# Patient Record
Sex: FEMALE | Race: WHITE | NOT HISPANIC OR LATINO | Employment: OTHER | URBAN - METROPOLITAN AREA
[De-identification: names, ages, dates, MRNs, and addresses within clinical notes are randomized per-mention and may not be internally consistent; named-entity substitution may affect disease eponyms.]

---

## 2023-03-06 PROBLEM — Z01.419 ENCOUNTER FOR GYNECOLOGICAL EXAMINATION (GENERAL) (ROUTINE) WITHOUT ABNORMAL FINDINGS: Status: ACTIVE | Noted: 2023-03-06

## 2023-03-06 NOTE — PROGRESS NOTES
Assessment/Plan:    Encounter for gynecological examination (general) (routine) without abnormal findings  All well, no complaints  Normal breast and pelvic exams  Last pap 2/2020 neg; no further indicated  Mammo order given, last 2016  Colonoscopy 2017 due 2027  Dexa 2016, order given       Diagnoses and all orders for this visit:    Encounter for gynecological examination (general) (routine) without abnormal findings    Encounter for screening mammogram for breast cancer  -     Mammo screening bilateral w 3d & cad; Future    Asymptomatic menopausal state  -     DXA bone density spine hip and pelvis; Future    Osteoporosis screening  -     DXA bone density spine hip and pelvis; Future    Other orders  -     Liquid-based pap, screening  -     losartan (COZAAR) 25 mg tablet; Take 50 mg by mouth daily  -     buPROPion (WELLBUTRIN XL) 150 mg 24 hr tablet; Take 150 mg by mouth every morning  -     metoprolol succinate (TOPROL-XL) 25 mg 24 hr tablet; Take 25 mg by mouth daily          Subjective:      Patient ID: Adin Mendez is a 67 y o  female  HPI Here for Medicare biannual breast and pelvic exams  Last 2/2020 SK    The following portions of the patient's history were reviewed and updated as appropriate:   She  has a past medical history of Hypertension, Osteopenia, and Renal calculi  She   Patient Active Problem List    Diagnosis Date Noted   • Encounter for gynecological examination (general) (routine) without abnormal findings 03/06/2023     She  has a past surgical history that includes Parathyroidectomy (2013); Ankle surgery; and Colonoscopy (2017)  Her family history includes Other in her mother  She  reports that she has never smoked  She has never used smokeless tobacco  She reports that she does not drink alcohol and does not use drugs    Current Outpatient Medications   Medication Sig Dispense Refill   • buPROPion (WELLBUTRIN XL) 150 mg 24 hr tablet Take 150 mg by mouth every morning     • losartan (COZAAR) 25 mg tablet Take 50 mg by mouth daily     • metoprolol succinate (TOPROL-XL) 25 mg 24 hr tablet Take 25 mg by mouth daily       No current facility-administered medications for this visit  She is allergic to bee venom, iodinated contrast media, prochlorperazine, and latex       Review of Systems  No PMB, breast, bladder, bowel changes   No new persistent pain, bloating, early satiety or pelvic pressure      Objective:      /80   Ht 5' 7" (1 702 m)   Wt 73 kg (161 lb)   BMI 25 22 kg/m²          Physical Exam    General appearance: no distress, pleasant  Neck: thyroid without nodules or thyromegaly, no palpable adenopathy  Lymph nodes: no palpable adenopathy  Breasts: no masses, nodes or skin changes  Abdomen: soft, non tender, no palpable masses  Pelvic exam: normal atrophic external genitalia, urethral meatus normal, vagina atrophic without lesions, cervix atrophic without lesions, uterus small, non tender, no adnexal masses, non tender  Rectal exam: normal sphincter tone, no masses, RV confirms above

## 2023-03-07 ENCOUNTER — OFFICE VISIT (OUTPATIENT)
Dept: OBGYN CLINIC | Facility: CLINIC | Age: 73
End: 2023-03-07

## 2023-03-07 VITALS
SYSTOLIC BLOOD PRESSURE: 122 MMHG | DIASTOLIC BLOOD PRESSURE: 80 MMHG | HEIGHT: 67 IN | WEIGHT: 161 LBS | BODY MASS INDEX: 25.27 KG/M2

## 2023-03-07 DIAGNOSIS — Z12.31 ENCOUNTER FOR SCREENING MAMMOGRAM FOR BREAST CANCER: ICD-10-CM

## 2023-03-07 DIAGNOSIS — Z01.419 ENCOUNTER FOR GYNECOLOGICAL EXAMINATION (GENERAL) (ROUTINE) WITHOUT ABNORMAL FINDINGS: Primary | ICD-10-CM

## 2023-03-07 DIAGNOSIS — Z13.820 OSTEOPOROSIS SCREENING: ICD-10-CM

## 2023-03-07 DIAGNOSIS — Z78.0 ASYMPTOMATIC MENOPAUSAL STATE: ICD-10-CM

## 2023-03-07 RX ORDER — METOPROLOL SUCCINATE 25 MG/1
25 TABLET, EXTENDED RELEASE ORAL DAILY
COMMUNITY

## 2023-03-07 RX ORDER — BUPROPION HYDROCHLORIDE 150 MG/1
150 TABLET ORAL EVERY MORNING
COMMUNITY
Start: 2023-02-01

## 2023-03-07 RX ORDER — LOSARTAN POTASSIUM 25 MG/1
50 TABLET ORAL DAILY
COMMUNITY
Start: 2023-02-24

## 2023-03-07 NOTE — ASSESSMENT & PLAN NOTE
All well, no complaints  Normal breast and pelvic exams    Last pap 2/2020 neg; no further indicated  Mammo order given, last 2016  Colonoscopy 2017 due 2027  Dexa 2016, order given

## 2023-03-07 NOTE — PATIENT INSTRUCTIONS
Return to office in two years unless having any problems such as breast changes, bleeding, new persistent pain, new progressive bloating, new problems eating (getting full to quickly) or new constant urinary pressure that does not resolve in one week  Call in September of 2024 for the March 2025 visit  Try Aquafor or vitamin A&D ointment

## 2023-04-27 DIAGNOSIS — Z78.0 ASYMPTOMATIC MENOPAUSAL STATE: ICD-10-CM

## 2023-04-27 DIAGNOSIS — Z13.820 OSTEOPOROSIS SCREENING: ICD-10-CM

## 2023-04-28 ENCOUNTER — TELEPHONE (OUTPATIENT)
Dept: OBGYN CLINIC | Facility: CLINIC | Age: 73
End: 2023-04-28

## 2023-04-28 NOTE — TELEPHONE ENCOUNTER
Left a message for patient to call back regarding request of Dr John Javier to schedule [facility] to discuss Dexa and increase risk for hip fracture

## 2023-05-02 PROBLEM — Z91.89 HIGH RISK FOR HIP FRACTURE: Status: ACTIVE | Noted: 2023-05-02

## 2023-05-02 NOTE — TELEPHONE ENCOUNTER
Spoke with Bina Mistry about Dexa Scan results & Dr Sapna Winston recommendation  Bina Mistry states she [facility]  She has scheduled an appt with her PCP, to discuss results & medication  Bina Mistry wants her PCP to handle all of her medications, then having different physicians  Sent message to Dr Louis Suresh

## 2023-05-09 DIAGNOSIS — Z12.31 ENCOUNTER FOR SCREENING MAMMOGRAM FOR BREAST CANCER: ICD-10-CM

## 2023-08-28 ENCOUNTER — OFFICE VISIT (OUTPATIENT)
Dept: OBGYN CLINIC | Facility: CLINIC | Age: 73
End: 2023-08-28
Payer: MEDICARE

## 2023-08-28 VITALS
WEIGHT: 166 LBS | HEIGHT: 68 IN | SYSTOLIC BLOOD PRESSURE: 122 MMHG | DIASTOLIC BLOOD PRESSURE: 70 MMHG | BODY MASS INDEX: 25.16 KG/M2

## 2023-08-28 DIAGNOSIS — L29.9 SCALP ITCH: ICD-10-CM

## 2023-08-28 DIAGNOSIS — N76.0 VULVOVAGINITIS: Primary | ICD-10-CM

## 2023-08-28 PROCEDURE — 99213 OFFICE O/P EST LOW 20 MIN: CPT | Performed by: NURSE PRACTITIONER

## 2023-08-28 RX ORDER — ZOLPIDEM TARTRATE 5 MG/1
TABLET ORAL AS NEEDED
COMMUNITY
Start: 2023-08-25

## 2023-08-28 RX ORDER — CLOTRIMAZOLE AND BETAMETHASONE DIPROPIONATE 10; .64 MG/G; MG/G
CREAM TOPICAL 2 TIMES DAILY
Qty: 45 G | Refills: 1 | Status: SHIPPED | OUTPATIENT
Start: 2023-08-28

## 2023-08-28 RX ORDER — FLUCONAZOLE 150 MG/1
150 TABLET ORAL ONCE
Qty: 2 TABLET | Refills: 0 | Status: SHIPPED | OUTPATIENT
Start: 2023-08-28 | End: 2023-08-28

## 2023-08-28 RX ORDER — ACETAMINOPHEN AND CODEINE PHOSPHATE 300; 30 MG/1; MG/1
TABLET ORAL AS NEEDED
COMMUNITY

## 2023-08-28 RX ORDER — KETOCONAZOLE 20 MG/ML
1 SHAMPOO TOPICAL ONCE
Qty: 1 ML | Refills: 0 | Status: SHIPPED | OUTPATIENT
Start: 2023-08-28 | End: 2023-08-28

## 2023-08-28 NOTE — PATIENT INSTRUCTIONS
Open to air at bedtime. Cotton underwear. Wear looser fitting clothing. Get out of exercise clothes and bathing suits ASAP. Avoid bathroom wipes, feminine washes/scented soaps. Watch sugar and carb intake. Refrain from sexual activity while on treatment.     Lotrisone cream twice a day for 1 week  Diflucan 1 now repeat in 3 days  Itchy scalp Ketoconozole shampoo x 1 dose  Zyrtec 1 tab daily

## 2023-08-28 NOTE — PROGRESS NOTES
Assessment/Plan:  Open to air at bedtime. Cotton underwear. Wear looser fitting clothing. Get out of exercise clothes and bathing suits ASAP. Avoid bathroom wipes, feminine washes/scented soaps. Watch sugar and carb intake. Refrain from sexual activity while on treatment. Lotrisone cream twice a day for 1 week  Diflucan 1 now repeat in 3 days  Itchy scalp Ketoconozole shampoo x 1 dose  Zyrtec 1 tab daily       Diagnoses and all orders for this visit:    Vulvovaginitis  -     clotrimazole-betamethasone (LOTRISONE) 1-0.05 % cream; Apply topically 2 (two) times a day  -     fluconazole (DIFLUCAN) 150 mg tablet; Take 1 tablet (150 mg total) by mouth once for 1 dose Repeat in 3 days    Scalp itch  -     ketoconazole (NIZORAL) 2 % shampoo; Apply 1 Application topically once for 1 dose    Other orders  -     acetaminophen-codeine (TYLENOL/CODEINE #3) 300-30 MG per tablet; Take by mouth if needed  -     zolpidem (AMBIEN) 5 mg tablet; if needed          Subjective:      Patient ID: Yumiko Koroma is a 67 y.o. female. Here for eval of prolonged yeast infection Last seen Dr Gamboa Gravely 3/2023 for [facility] She was dx with Lymes disease and just completed 3 weeks of antibiotics She has been having a lot of vaginal itching Some burning  She is also c/o scalp itching  She states couldn't stand it treated herself with lice shampoo twice because she did not know what else to do and had no improvement       The following portions of the patient's history were reviewed and updated as appropriate: allergies, current medications, past family history, past medical history, past social history, past surgical history and problem list.    Review of Systems   Constitutional: Negative for chills and fever. Genitourinary: Positive for vaginal pain. Negative for dysuria, frequency, pelvic pain and vaginal bleeding.         Vaginal itching/burning         Objective:      /70   Ht 5' 8" (1.727 m)   Wt 75.3 kg (166 lb)   Breastfeeding No BMI 25.24 kg/m²          Physical Exam  Vitals and nursing note reviewed. Constitutional:       General: She is not in acute distress. Appearance: Normal appearance. HENT:      Head: Normocephalic and atraumatic. Abdominal:      General: There is no distension. Palpations: There is no mass. Tenderness: There is no abdominal tenderness. Genitourinary:     Exam position: Lithotomy position. Labia:         Right: No rash or lesion. Left: No rash or lesion. Vagina: Vaginal discharge and erythema present. Cervix: No cervical motion tenderness, discharge, lesion or cervical bleeding. Uterus: Normal.    Lymphadenopathy:      Lower Body: No right inguinal adenopathy. No left inguinal adenopathy. Skin:     General: Skin is warm and dry. Neurological:      General: No focal deficit present. Mental Status: She is alert and oriented to person, place, and time. Psychiatric:         Mood and Affect: Mood normal.         Behavior: Behavior normal.         Thought Content:  Thought content normal.

## 2023-09-05 NOTE — PROGRESS NOTES
Assessment/Plan:  Open to air at bedtime. Cotton underwear. Wear looser fitting clothing. Get out of exercise clothes and bathing suits ASAP. Avoid bathroom wipes, feminine washes/scented soaps. Wash with cetaphil cleanser or just water. NO soaps . Watch sugar and carb intake. Refrain from sexual activity while on treatment  Lotrisone cream twice a day for 2 weeks  Calmoseptine throughout the day as needed as soother  Culture sent out   Terazol 1 applicator at bedtime for 7 nights        Diagnoses and all orders for this visit:    Vulvovaginitis  -     SURESWAB ADVANCED VAGINITIS, TMA  -     terconazole (TERAZOL 7) 0.4 % vaginal cream; Insert 1 applicator into the vagina daily at bedtime for 7 days    Other orders  -     Liquid-based pap, screening          Subjective:      Patient ID: Le Rueda is a 67 y.o. female. Seen 9 days ago with vulvovaginal itching having completed a 3 weeks of abx for lymes She was given diflucan and lotrisone cream She took both diflucan and used the lotrisone for a few days and then stopped  She is here as not much better Feels less swollen but still has vaginal itching and burning with wiping Using water only  Has sensitive skin so uses clear and free detergent Concerned as has upcoming hip replacement surgery in 2 weeks and was worried about getting a yeast infection from that. The following portions of the patient's history were reviewed and updated as appropriate: allergies, current medications, past family history, past medical history, past social history, past surgical history and problem list.    Review of Systems   Constitutional: Negative for chills and fever. Genitourinary: Positive for vaginal discharge and vaginal pain. Negative for dysuria, genital sores and pelvic pain. Psychiatric/Behavioral: The patient is nervous/anxious.           Objective:      /76 (BP Location: Left arm, Patient Position: Sitting, Cuff Size: Large)   Ht 5' 8" (1.727 m) Wt 74 kg (163 lb 3.2 oz)   BMI 24.81 kg/m²          Physical Exam  Vitals and nursing note reviewed. Constitutional:       General: She is not in acute distress. Appearance: Normal appearance. HENT:      Head: Normocephalic and atraumatic. Abdominal:      General: There is no distension. Palpations: There is no mass. Tenderness: There is no abdominal tenderness. Genitourinary:     Exam position: Lithotomy position. Labia:         Right: No rash or lesion. Left: No rash or lesion. Vagina: Vaginal discharge and erythema present. Cervix: No cervical motion tenderness, discharge, lesion or cervical bleeding. Uterus: Normal.       Comments: Erythema external genitalia moderate amount of thick white discharge in vagina removed with scopette   Sureswab obtained   Lymphadenopathy:      Lower Body: No right inguinal adenopathy. No left inguinal adenopathy. Skin:     General: Skin is warm and dry. Neurological:      General: No focal deficit present. Mental Status: She is alert and oriented to person, place, and time. Psychiatric:         Mood and Affect: Mood normal.         Behavior: Behavior normal.         Thought Content:  Thought content normal.

## 2023-09-06 ENCOUNTER — OFFICE VISIT (OUTPATIENT)
Dept: OBGYN CLINIC | Facility: CLINIC | Age: 73
End: 2023-09-06
Payer: MEDICARE

## 2023-09-06 VITALS
WEIGHT: 163.2 LBS | HEIGHT: 68 IN | DIASTOLIC BLOOD PRESSURE: 76 MMHG | SYSTOLIC BLOOD PRESSURE: 128 MMHG | BODY MASS INDEX: 24.74 KG/M2

## 2023-09-06 DIAGNOSIS — N76.0 VULVOVAGINITIS: Primary | ICD-10-CM

## 2023-09-06 PROCEDURE — 99213 OFFICE O/P EST LOW 20 MIN: CPT | Performed by: NURSE PRACTITIONER

## 2023-09-06 NOTE — PATIENT INSTRUCTIONS
Open to air at bedtime. Cotton underwear. Wear looser fitting clothing. Get out of exercise clothes and bathing suits ASAP. Avoid bathroom wipes, feminine washes/scented soaps. Wash with cetaphil cleanser or just water. NO soaps . Watch sugar and carb intake.  Refrain from sexual activity while on treatment  Lotrisone cream twice a day for 2 weeks  Calmoseptine throughout the day as needed as soother  Culture sent out   Terazol 1 applicator at bedtime for 7 nights

## 2023-09-07 LAB
BV BACTERIA RRNA VAG QL NAA+PROBE: NEGATIVE
C GLABRATA RNA VAG QL NAA+PROBE: NOT DETECTED
CANDIDA RRNA VAG QL PROBE: NOT DETECTED
T VAGINALIS RRNA SPEC QL NAA+PROBE: NOT DETECTED

## 2023-09-13 NOTE — PROGRESS NOTES
Assessment/Plan:    Vulvar irritation  Anxious re: upcoming right hip replacement and possible yeast. Had negative cultures for candida, etc 9/6/23. Reports labia majora irritation and introital irritation with wiping - using Paxton. Exam with atrophy, no abnormal discharge. Recommend continuing with every other day Cetaphil, switching to [place] paper, dabbing only, barrier with Aquafor or vitamin A&D. Reassurance given. High risk for hip fracture - 2023. Dexa from 4/2023 reviewed with osteopenia and ten year hip fracture risk of 5%. Interested in medication, orthopedic surgeon instructed to start six wks from hip replacement. Reviewed risk of hip fracture. Reviewed impact of hip fracture on overall morbidity and mortality. Encouraged consideration of health impact of diagnosis balanced with risks of medications when making decisions on medications versus conservative monitoring. Discussed with patient dexa results and increased risk of fracture. Risks and benefits of bisphosphonates, Evista reviewed. Aware of GI side effects of bisphosphonates and appropriate administration technique to decrease risk. Aware of increased risk of osteonecrosis of jaw, muscle and joint aches and increased risk of long bone fracture with prolonged use of bisphosphonates. Increased risk of thrombosis with Evista discussed and pt declined consideration of it. At the end of our discussion, Artemio Clinton would like to start fosamax after surgery. Rx sent. Encouraged confirming with surgeon appropriate timing for initiation. Repeat dexa in 2 years for follow up. Diagnoses and all orders for this visit:    Vulvar irritation    High risk for hip fracture - 2023. PCP to manage per pt  -     alendronate (FOSAMAX) 70 mg tablet; Take 1 tablet (70 mg total) by mouth every 7 days          Subjective:      Patient ID: Blaze Hagan is a 67 y.o. female.     HPI here for follow up of vaginal irritation and would like to discuss dexa. The following portions of the patient's history were reviewed and updated as appropriate:   She  has a past medical history of Arthritis, Hypertension, Osteopenia, and Renal calculi. She   Patient Active Problem List    Diagnosis Date Noted   • Vulvar irritation 09/15/2023   • High risk for hip fracture - 2023.  05/02/2023   • Encounter for gynecological examination (general) (routine) without abnormal findings 03/06/2023     She  has a past surgical history that includes Parathyroidectomy (2013); Ankle surgery; and Colonoscopy (2017). Her family history includes Other in her mother. She  reports that she has never smoked. She has never used smokeless tobacco. She reports that she does not drink alcohol and does not use drugs. Current Outpatient Medications   Medication Sig Dispense Refill   • acetaminophen-codeine (TYLENOL/CODEINE #3) 300-30 MG per tablet Take by mouth if needed     • alendronate (FOSAMAX) 70 mg tablet Take 1 tablet (70 mg total) by mouth every 7 days 12 tablet 2   • buPROPion (WELLBUTRIN XL) 150 mg 24 hr tablet Take 150 mg by mouth every morning     • ketoconazole (NIZORAL) 2 % shampoo Apply 1 Application topically once for 1 dose 1 mL 0   • losartan (COZAAR) 25 mg tablet Take 50 mg by mouth daily     • metoprolol succinate (TOPROL-XL) 25 mg 24 hr tablet Take 25 mg by mouth daily     • zolpidem (AMBIEN) 5 mg tablet if needed     • clotrimazole-betamethasone (LOTRISONE) 1-0.05 % cream Apply topically 2 (two) times a day (Patient not taking: Reported on 9/6/2023) 45 g 1     No current facility-administered medications for this visit. She is allergic to bee venom, doxycycline, iodinated contrast media, prochlorperazine, and latex. .    Review of Systems  +vulvar irritation, no bleeding    Objective:      /64 (BP Location: Left arm, Patient Position: Sitting, Cuff Size: Large)   Ht 5' 6.75" (1.695 m)   Wt 73.8 kg (162 lb 12.8 oz)   BMI 25.69 kg/m²          Physical Exam    Appears well, no apparent distress.    Somewhat anxious  Pelvic exam: atrophic external genitalia without erythema, induration or white epithelium, vagina atrophic without discharge

## 2023-09-15 ENCOUNTER — OFFICE VISIT (OUTPATIENT)
Dept: OBGYN CLINIC | Facility: CLINIC | Age: 73
End: 2023-09-15
Payer: MEDICARE

## 2023-09-15 VITALS
HEIGHT: 67 IN | SYSTOLIC BLOOD PRESSURE: 120 MMHG | DIASTOLIC BLOOD PRESSURE: 64 MMHG | BODY MASS INDEX: 25.55 KG/M2 | WEIGHT: 162.8 LBS

## 2023-09-15 DIAGNOSIS — N90.89 VULVAR IRRITATION: Primary | ICD-10-CM

## 2023-09-15 DIAGNOSIS — Z91.89 HIGH RISK FOR HIP FRACTURE: ICD-10-CM

## 2023-09-15 PROCEDURE — 99214 OFFICE O/P EST MOD 30 MIN: CPT | Performed by: OBSTETRICS & GYNECOLOGY

## 2023-09-15 RX ORDER — ALENDRONATE SODIUM 70 MG/1
70 TABLET ORAL
Qty: 12 TABLET | Refills: 2 | Status: SHIPPED | OUTPATIENT
Start: 2023-09-15

## 2023-09-15 NOTE — ASSESSMENT & PLAN NOTE
Dexa from 4/2023 reviewed with osteopenia and ten year hip fracture risk of 5%. Interested in medication, orthopedic surgeon instructed to start six wks from hip replacement. Reviewed risk of hip fracture. Reviewed impact of hip fracture on overall morbidity and mortality. Encouraged consideration of health impact of diagnosis balanced with risks of medications when making decisions on medications versus conservative monitoring. Discussed with patient dexa results and increased risk of fracture. Risks and benefits of bisphosphonates, Evista reviewed. Aware of GI side effects of bisphosphonates and appropriate administration technique to decrease risk. Aware of increased risk of osteonecrosis of jaw, muscle and joint aches and increased risk of long bone fracture with prolonged use of bisphosphonates. Increased risk of thrombosis with Evista discussed and pt declined consideration of it. At the end of our discussion, Laura Dominique would like to start fosamax after surgery. Rx sent. Encouraged confirming with surgeon appropriate timing for initiation. Repeat dexa in 2 years for follow up.

## 2023-09-15 NOTE — PATIENT INSTRUCTIONS
Switch to [employer] toilet paper and try to dab instead of wiping. Try a barrier daily with Aquafor or Vitamin A&D ointment. Ask your orthopedic surgeon if the fosamax should start six weeks from your hip replacement.

## 2023-09-15 NOTE — ASSESSMENT & PLAN NOTE
Anxious re: upcoming right hip replacement and possible yeast. Had negative cultures for candida, etc 9/6/23. Reports labia majora irritation and introital irritation with wiping - using Paxton. Exam with atrophy, no abnormal discharge. Recommend continuing with every other day Cetaphil, switching to [place] paper, dabbing only, barrier with Aquafor or vitamin A&D. Reassurance given.

## 2023-09-15 NOTE — LETTER
September 15, 2023     Katlyn Li DO  [facility] [address] [place] [address]    Patient: Vitor Izaguirre   YOB: 1950   Date of Visit: 9/15/2023       Dear Dr. Stormy Dover: Thank you for referring Fernando Rojas to me for evaluation. Below are my notes for this consultation. If you have questions, please do not hesitate to call me. I look forward to following your patient along with you. Sincerely,        Karyna Cade MD        CC: No Recipients    Karyna Cade MD  9/15/2023  9:14 AM  Sign when Signing Visit  Assessment/Plan:    Vulvar irritation  Anxious re: upcoming right hip replacement and possible yeast. Had negative cultures for candida, etc 9/6/23. Reports labia majora irritation and introital irritation with wiping - using Paxton. Exam with atrophy, no abnormal discharge. Recommend continuing with every other day Cetaphil, switching to [place] paper, dabbing only, barrier with Aquafor or vitamin A&D. Reassurance given. High risk for hip fracture - 2023. Dexa from 4/2023 reviewed with osteopenia and ten year hip fracture risk of 5%. Interested in medication, orthopedic surgeon instructed to start six wks from hip replacement. Reviewed risk of hip fracture. Reviewed impact of hip fracture on overall morbidity and mortality. Encouraged consideration of health impact of diagnosis balanced with risks of medications when making decisions on medications versus conservative monitoring. Discussed with patient dexa results and increased risk of fracture. Risks and benefits of bisphosphonates, Evista reviewed. Aware of GI side effects of bisphosphonates and appropriate administration technique to decrease risk. Aware of increased risk of osteonecrosis of jaw, muscle and joint aches and increased risk of long bone fracture with prolonged use of bisphosphonates. Increased risk of thrombosis with Evista discussed and pt declined consideration of it.   At the end of our discussion, Domenico Lopez would like to start fosamax after surgery. Rx sent. Encouraged confirming with surgeon appropriate timing for initiation. Repeat dexa in 2 years for follow up. Diagnoses and all orders for this visit:    Vulvar irritation    High risk for hip fracture - 2023. PCP to manage per pt  -     alendronate (FOSAMAX) 70 mg tablet; Take 1 tablet (70 mg total) by mouth every 7 days         Subjective:     Patient ID: Jeannette Wood is a 67 y.o. female. HPI here for follow up of vaginal irritation and would like to discuss dexa. The following portions of the patient's history were reviewed and updated as appropriate:   She  has a past medical history of Arthritis, Hypertension, Osteopenia, and Renal calculi. She   Patient Active Problem List    Diagnosis Date Noted   • Vulvar irritation 09/15/2023   • High risk for hip fracture - 2023.  05/02/2023   • Encounter for gynecological examination (general) (routine) without abnormal findings 03/06/2023     She  has a past surgical history that includes Parathyroidectomy (2013); Ankle surgery; and Colonoscopy (2017). Her family history includes Other in her mother. She  reports that she has never smoked. She has never used smokeless tobacco. She reports that she does not drink alcohol and does not use drugs.   Current Outpatient Medications   Medication Sig Dispense Refill   • acetaminophen-codeine (TYLENOL/CODEINE #3) 300-30 MG per tablet Take by mouth if needed     • alendronate (FOSAMAX) 70 mg tablet Take 1 tablet (70 mg total) by mouth every 7 days 12 tablet 2   • buPROPion (WELLBUTRIN XL) 150 mg 24 hr tablet Take 150 mg by mouth every morning     • ketoconazole (NIZORAL) 2 % shampoo Apply 1 Application topically once for 1 dose 1 mL 0   • losartan (COZAAR) 25 mg tablet Take 50 mg by mouth daily     • metoprolol succinate (TOPROL-XL) 25 mg 24 hr tablet Take 25 mg by mouth daily     • zolpidem (AMBIEN) 5 mg tablet if needed     • clotrimazole-betamethasone (LOTRISONE) 1-0.05 % cream Apply topically 2 (two) times a day (Patient not taking: Reported on 9/6/2023) 45 g 1     No current facility-administered medications for this visit. She is allergic to bee venom, doxycycline, iodinated contrast media, prochlorperazine, and latex. .    Review of Systems +vulvar irritation, no bleeding    Objective:      /64 (BP Location: Left arm, Patient Position: Sitting, Cuff Size: Large)   Ht 5' 6.75" (1.695 m)   Wt 73.8 kg (162 lb 12.8 oz)   BMI 25.69 kg/m²         Physical Exam   Appears well, no apparent distress.    Somewhat anxious  Pelvic exam: atrophic external genitalia without erythema, induration or white epithelium, vagina atrophic without discharge

## 2024-02-21 PROBLEM — Z01.419 ENCOUNTER FOR GYNECOLOGICAL EXAMINATION (GENERAL) (ROUTINE) WITHOUT ABNORMAL FINDINGS: Status: RESOLVED | Noted: 2023-03-06 | Resolved: 2024-02-21

## 2024-04-18 ENCOUNTER — HOSPITAL ENCOUNTER (OUTPATIENT)
Dept: CT IMAGING | Facility: HOSPITAL | Age: 74
Discharge: HOME | End: 2024-04-18
Payer: COMMERCIAL

## 2024-04-18 DIAGNOSIS — I10 HYPERTENSION COMPLICATIONS: ICD-10-CM

## 2024-04-18 PROCEDURE — 75571 CT HRT W/O DYE W/CA TEST: CPT

## 2024-06-18 ENCOUNTER — NURSE TRIAGE (OUTPATIENT)
Age: 74
End: 2024-06-18

## 2024-06-18 NOTE — TELEPHONE ENCOUNTER
"Reason for Disposition  • Abnormal color vaginal discharge (i.e., yellow, green, gray)    Answer Assessment - Initial Assessment Questions  1. DISCHARGE: \"Describe the discharge.\" (e.g., white, yellow, green, gray, foamy, cottage cheese-like)      green  2. ODOR: \"Is there a bad odor?\"      denies  3. ONSET: \"When did the discharge begin?\"         4. RASH: \"Is there a rash in that area?\" If Yes, ask: \"Describe it.\" (e.g., redness, blisters, sores, bumps)      3 days ago  5. ABDOMINAL PAIN: \"Are you having any abdominal pain?\" If Yes, ask: \"What does it feel like? \" (e.g., crampy, dull, intermittent, constant)       denies  6. ABDOMINAL PAIN SEVERITY: If present, ask: \"How bad is it?\"  (e.g., mild, moderate, severe)   - MILD - doesn't interfere with normal activities    - MODERATE - interferes with normal activities or awakens from sleep    - SEVERE - patient doesn't want to move (R/O peritonitis)       N/a  7. CAUSE: \"What do you think is causing the discharge?\" \"Have you had the same problem before? What happened then?\"      Finished antibiotics  8. OTHER SYMPTOMS: \"Do you have any other symptoms?\" (e.g., fever, itching, vaginal bleeding, pain with urination, injury to genital area, vaginal foreign body)      Vaginal irritation  9. PREGNANCY: \"Is there any chance you are pregnant?\" \"When was your last menstrual period?\"      Post menopausal    Protocols used: Vaginal Discharge-ADULT-[place]    "

## 2024-06-18 NOTE — TELEPHONE ENCOUNTER
Regarding: yeast infection symptoms  ----- Message from Ally KISER sent at 6/18/2024  3:07 PM EDT -----  Patient calling in with symptoms of yeast infection. Patient was on antibiotics prior and took the 7 day monistat and is still having a greenish discharge.      Tried CTS

## 2024-06-19 NOTE — PROGRESS NOTES
"Assessment/Plan:  Open to air at bedtime. Cotton underwear.  Wear looser fitting clothing. Get out of exercise clothes and bathing suits ASAP. Avoid bathroom wipes, feminine washes/scented soaps. Wash with cetaphil cleanser or just water.  NO soaps . Watch sugar and carb intake. Refrain from sexual activity while on treatment          Diagnoses and all orders for this visit:    Vulvar irritation  -     betamethasone, augmented, (DIPROLENE-AF) 0.05 % cream; Apply topically 2 (two) times a day    Other orders  -     escitalopram (LEXAPRO) 10 mg tablet; TAKE 1 TAB ORALLY DAILY  -     dilTIAZem HCl  MG TB24; Take 1 tablet by mouth daily          Subjective:      Patient ID: Windy Hager is a 73 y.o. female.    Here for eval was having c/o greenish vaginal discharge Pt was on Abx for the past month for lymes disease again and dev yeast infection  Used Monistat x 7 nights and still having external irritation         The following portions of the patient's history were reviewed and updated as appropriate: allergies, current medications, past family history, past medical history, past social history, past surgical history, and problem list.    Review of Systems   Constitutional:  Negative for chills and fever.   Gastrointestinal:  Negative for abdominal pain.   Genitourinary:  Negative for dysuria, frequency, genital sores, vaginal bleeding, vaginal discharge and vaginal pain.        Vulvar irritation         Objective:      /50 (BP Location: Right arm, Patient Position: Sitting, Cuff Size: Standard)   Temp 98 °F (36.7 °C)   Ht 5' 6.75\" (1.695 m)   Wt 75.8 kg (167 lb)   BMI 26.35 kg/m²          Physical Exam  Vitals and nursing note reviewed.   Constitutional:       General: She is not in acute distress.     Appearance: Normal appearance.   HENT:      Head: Normocephalic and atraumatic.   Abdominal:      General: There is no distension.      Palpations: There is no mass.      Tenderness: There is no " abdominal tenderness.   Genitourinary:     Exam position: Lithotomy position.      Labia:         Right: No rash or lesion.         Left: No rash or lesion.       Vagina: Erythema present. No vaginal discharge.      Cervix: No cervical motion tenderness, discharge, lesion or cervical bleeding.      Uterus: Normal.       Comments: Minimal erythema introitus   Lymphadenopathy:      Lower Body: No right inguinal adenopathy. No left inguinal adenopathy.   Skin:     General: Skin is warm and dry.   Neurological:      General: No focal deficit present.      Mental Status: She is alert and oriented to person, place, and time.   Psychiatric:         Mood and Affect: Mood normal.         Behavior: Behavior normal.         Thought Content: Thought content normal.

## 2024-06-20 ENCOUNTER — OFFICE VISIT (OUTPATIENT)
Dept: OBGYN CLINIC | Facility: CLINIC | Age: 74
End: 2024-06-20
Payer: MEDICARE

## 2024-06-20 VITALS
TEMPERATURE: 98 F | DIASTOLIC BLOOD PRESSURE: 50 MMHG | HEIGHT: 67 IN | WEIGHT: 167 LBS | BODY MASS INDEX: 26.21 KG/M2 | SYSTOLIC BLOOD PRESSURE: 110 MMHG

## 2024-06-20 DIAGNOSIS — N90.89 VULVAR IRRITATION: Primary | ICD-10-CM

## 2024-06-20 PROCEDURE — 99212 OFFICE O/P EST SF 10 MIN: CPT | Performed by: NURSE PRACTITIONER

## 2024-06-20 RX ORDER — DILTIAZEM HYDROCHLORIDE EXTENDED-RELEASE TABLETS 120 MG/1
1 TABLET, EXTENDED RELEASE ORAL DAILY
COMMUNITY
Start: 2024-04-04

## 2024-06-20 RX ORDER — ESCITALOPRAM OXALATE 10 MG/1
TABLET ORAL
COMMUNITY
Start: 2024-04-02

## 2024-06-20 RX ORDER — BETAMETHASONE DIPROPIONATE 0.5 MG/G
CREAM TOPICAL 2 TIMES DAILY
Qty: 50 G | Refills: 1 | Status: SHIPPED | OUTPATIENT
Start: 2024-06-20

## 2024-06-20 NOTE — PATIENT INSTRUCTIONS
Open to air at bedtime. Cotton underwear.  Wear looser fitting clothing. Get out of exercise clothes and bathing suits ASAP. Avoid bathroom wipes, feminine washes/scented soaps. Wash with cetaphil cleanser or just water.  NO soaps . Watch sugar and carb intake. Refrain from sexual activity while on treatment

## 2024-08-04 ENCOUNTER — HOSPITAL ENCOUNTER (OUTPATIENT)
Dept: ULTRASOUND IMAGING | Facility: HOSPITAL | Age: 74
Discharge: HOME | End: 2024-08-04
Payer: MEDICARE

## 2024-08-04 DIAGNOSIS — E04.1 THYROID NODULE: ICD-10-CM

## 2024-08-04 PROCEDURE — 76536 US EXAM OF HEAD AND NECK: CPT

## 2025-08-11 ENCOUNTER — NURSE TRIAGE (OUTPATIENT)
Age: 75
End: 2025-08-11

## 2025-08-13 PROBLEM — E78.2 MIXED HYPERLIPIDEMIA: Status: ACTIVE | Noted: 2025-08-13

## 2025-08-13 PROBLEM — M17.11 UNILATERAL PRIMARY OSTEOARTHRITIS, RIGHT KNEE: Status: ACTIVE | Noted: 2025-08-13

## 2025-08-13 PROBLEM — E04.1 NONTOXIC SINGLE THYROID NODULE: Status: ACTIVE | Noted: 2025-08-13

## 2025-08-13 PROBLEM — M19.079 PRIMARY OSTEOARTHRITIS, UNSPECIFIED ANKLE AND FOOT: Status: ACTIVE | Noted: 2025-08-13

## 2025-08-13 PROBLEM — E21.3 HYPERPARATHYROIDISM, UNSPECIFIED (HCC): Status: ACTIVE | Noted: 2025-08-13

## 2025-08-13 PROBLEM — M17.12 UNILATERAL PRIMARY OSTEOARTHRITIS, LEFT KNEE: Status: ACTIVE | Noted: 2025-08-13

## 2025-08-13 PROBLEM — M81.0 AGE-RELATED OSTEOPOROSIS WITHOUT CURRENT PATHOLOGICAL FRACTURE: Status: ACTIVE | Noted: 2025-08-13

## 2025-08-13 PROBLEM — E55.9 VITAMIN D DEFICIENCY: Status: ACTIVE | Noted: 2025-08-13

## 2025-08-13 PROBLEM — Z96.641 PRESENCE OF RIGHT ARTIFICIAL HIP JOINT: Status: ACTIVE | Noted: 2025-08-13

## 2025-08-13 PROBLEM — I10 ESSENTIAL (PRIMARY) HYPERTENSION: Status: ACTIVE | Noted: 2025-08-13

## 2025-08-13 PROBLEM — M16.11 UNILATERAL PRIMARY OSTEOARTHRITIS, RIGHT HIP: Status: ACTIVE | Noted: 2025-08-13

## 2025-08-13 PROBLEM — R30.0 DYSURIA: Status: ACTIVE | Noted: 2025-08-13

## 2025-08-13 PROBLEM — F41.8 MIXED ANXIETY DEPRESSIVE DISORDER: Status: ACTIVE | Noted: 2025-08-13
